# Patient Record
Sex: FEMALE | Race: WHITE | NOT HISPANIC OR LATINO | Employment: FULL TIME | URBAN - METROPOLITAN AREA
[De-identification: names, ages, dates, MRNs, and addresses within clinical notes are randomized per-mention and may not be internally consistent; named-entity substitution may affect disease eponyms.]

---

## 2024-10-23 ENCOUNTER — OFFICE VISIT (OUTPATIENT)
Dept: URGENT CARE | Age: 49
End: 2024-10-23
Payer: COMMERCIAL

## 2024-10-23 VITALS
RESPIRATION RATE: 16 BRPM | SYSTOLIC BLOOD PRESSURE: 120 MMHG | OXYGEN SATURATION: 98 % | HEIGHT: 62 IN | DIASTOLIC BLOOD PRESSURE: 70 MMHG | BODY MASS INDEX: 22.08 KG/M2 | HEART RATE: 75 BPM | TEMPERATURE: 98.5 F | WEIGHT: 120 LBS

## 2024-10-23 DIAGNOSIS — Z01.89 PATIENT REQUEST FOR DIAGNOSTIC TESTING: Primary | ICD-10-CM

## 2024-10-23 DIAGNOSIS — J02.9 SORE THROAT: ICD-10-CM

## 2024-10-23 LAB
S PYO AG THROAT QL: NEGATIVE
SARS-COV-2 AG UPPER RESP QL IA: NEGATIVE
VALID CONTROL: NORMAL

## 2024-10-23 PROCEDURE — G0382 LEV 3 HOSP TYPE B ED VISIT: HCPCS | Performed by: EMERGENCY MEDICINE

## 2024-10-23 PROCEDURE — S9083 URGENT CARE CENTER GLOBAL: HCPCS | Performed by: EMERGENCY MEDICINE

## 2024-10-23 PROCEDURE — 87880 STREP A ASSAY W/OPTIC: CPT

## 2024-10-23 PROCEDURE — 87811 SARS-COV-2 COVID19 W/OPTIC: CPT

## 2024-10-23 RX ORDER — TRAMADOL HYDROCHLORIDE 50 MG/1
TABLET ORAL
COMMUNITY
Start: 2024-10-17

## 2024-10-23 RX ORDER — METHYLPREDNISOLONE 4 MG/1
TABLET ORAL
Qty: 21 TABLET | Refills: 0 | Status: SHIPPED | OUTPATIENT
Start: 2024-10-23

## 2024-10-23 NOTE — PROGRESS NOTES
"  [facility]        NAME: Brigette Turk is a 49 y.o. female  : 1975    MRN: [id number]  DATE: 2024  TIME: 4:10 PM    Assessment and Plan   Patient request for diagnostic testing [Z01.89]  1. Patient request for diagnostic testing        2. Sore throat  POCT rapid ANTIGEN strepA    methylPREDNISolone 4 MG tablet therapy pack    Poct Covid 19 Rapid Antigen Test        Pt presents for eval of sore , scratchy throat ,voice change and  intermittent HA and feeling tired. Took 2 at home covid tests but wasn't sure of results. Believes one was positive. Discussed CDC guidelines and criteria for ordering. Pt has no congestion, no fevers, no meds have been taken. Pt adamantly requesting a covid test. Attempted to again discuss criteria for ordering and treatment plan will not change. Pt agitated. Stated \" I have a lot of anxiety and I need to know, now I have hives\". COVID test was ordered per request and was negative. Pt left annoyed.     Patient Instructions       Follow up with PCP in 3-5 days.  Proceed to  ER if symptoms worsen.    If tests have been performed at [facility], our office will contact you with results if changes need to be made to the care plan discussed with you at the visit.  You can review your full results on [facility].    Chief Complaint     Chief Complaint   Patient presents with    Cold Like Symptoms     Pt presents with sore throat, fatigue, cough and a headache. Pt took a covid test and thinks it may be positive. Started this weekend.          History of Present Illness       Pt presents for eval of sore , scratchy throat ,voice change and  intermittent HA and feeling tired. Took 2 at home covid tests but wasn't sure of results. Believes one was positive. Discussed CDC guidelines and criteria for ordering. Pt has no congestion, no fevers, no meds have been taken. Pt adamantly requesting a covid test. Attempted to again discuss criteria for ordering and treatment " "plan will not change. Pt agitated. Stated \" I have a lot of anxiety and I need to know, now I have hives\". COVID test was ordered per request and was negative. Pt left annoyed.         Review of Systems   Review of Systems   Constitutional:  Positive for fatigue. Negative for fever.   HENT:  Positive for postnasal drip, sore throat and voice change. Negative for congestion.         Symptoms worse in AM   Respiratory:  Negative for cough, shortness of breath and wheezing.    All other systems reviewed and are negative.        Current Medications       Current Outpatient Medications:     methylPREDNISolone 4 MG tablet therapy pack, Use as directed on package, Disp: 21 tablet, Rfl: 0    naproxen (NAPROSYN) 375 mg tablet, Take 375 mg by mouth 2 (two) times a day as needed With meals, Disp: , Rfl:     omeprazole (PriLOSEC) 20 mg delayed release capsule, TAKE 1 CAPSULE BY MOUTH ONCE DAILY TAKE 30 MINUTES BEFORE YOUR FIRST MEAL OF THE DAY, Disp: , Rfl:     traMADol (ULTRAM) 50 mg tablet, TAKE 1 TABLET BY MOUTH EVERY 8 HOURS AS NEEDED FOR MODERATE PAIN (PAIN SCORE 4-6), Disp: , Rfl:     Current Allergies     Allergies as of 10/23/2024 - Reviewed 10/23/2024   Allergen Reaction Noted    Bactrim [sulfamethoxazole-trimethoprim] Anaphylaxis 10/23/2024    Penicillins Anaphylaxis 10/23/2024            The following portions of the patient's history were reviewed and updated as appropriate: allergies, current medications, past family history, past medical history, past social history, past surgical history and problem list.     No past medical history on file.    No past surgical history on file.    No family history on file.      Medications have been verified.        Objective   /70   Pulse 75   Temp 98.5 °F (36.9 °C)   Resp 16   Ht 5' 2\" (1.575 m)   Wt 54.4 kg (120 lb)   SpO2 98%   BMI 21.95 kg/m²   No LMP recorded. Patient has had an ablation.       Physical Exam     Physical Exam  Vitals reviewed. "   Constitutional:       Appearance: Normal appearance.   HENT:      Right Ear: Tympanic membrane normal.      Left Ear: Tympanic membrane normal.      Nose: No congestion.      Mouth/Throat:      Pharynx: Postnasal drip present. No pharyngeal swelling, oropharyngeal exudate or posterior oropharyngeal erythema.   Eyes:      Pupils: Pupils are equal, round, and reactive to light.   Cardiovascular:      Rate and Rhythm: Normal rate and regular rhythm.      Pulses: Normal pulses.      Heart sounds: Normal heart sounds.   Pulmonary:      Effort: Pulmonary effort is normal.      Breath sounds: Normal breath sounds.   Lymphadenopathy:      Cervical: Cervical adenopathy present.   Neurological:      Mental Status: She is alert.